# Patient Record
Sex: FEMALE | Race: WHITE | NOT HISPANIC OR LATINO | Employment: OTHER | URBAN - METROPOLITAN AREA
[De-identification: names, ages, dates, MRNs, and addresses within clinical notes are randomized per-mention and may not be internally consistent; named-entity substitution may affect disease eponyms.]

---

## 2020-01-16 ENCOUNTER — HOSPITAL ENCOUNTER (OUTPATIENT)
Facility: CLINIC | Age: 85
End: 2020-01-16
Attending: INTERNAL MEDICINE | Admitting: INTERNAL MEDICINE
Payer: COMMERCIAL

## 2020-08-29 ENCOUNTER — HOSPITAL ENCOUNTER (EMERGENCY)
Facility: CLINIC | Age: 85
Discharge: HOME | End: 2020-08-29
Attending: EMERGENCY MEDICINE | Admitting: EMERGENCY MEDICINE
Payer: COMMERCIAL

## 2020-08-29 VITALS
OXYGEN SATURATION: 97 % | TEMPERATURE: 97.2 F | HEART RATE: 67 BPM | HEIGHT: 65 IN | RESPIRATION RATE: 16 BRPM | BODY MASS INDEX: 20.99 KG/M2 | DIASTOLIC BLOOD PRESSURE: 74 MMHG | WEIGHT: 126 LBS | SYSTOLIC BLOOD PRESSURE: 167 MMHG

## 2020-08-29 DIAGNOSIS — I10 HYPERTENSION, UNSPECIFIED TYPE: ICD-10-CM

## 2020-08-29 DIAGNOSIS — N39.0 URINARY TRACT INFECTION IN FEMALE: ICD-10-CM

## 2020-08-29 LAB
ALBUMIN UR-MCNC: NEGATIVE MG/DL
APPEARANCE UR: CLEAR
BILIRUB UR QL STRIP: NEGATIVE
COLOR UR AUTO: ABNORMAL
GLUCOSE UR STRIP-MCNC: NEGATIVE MG/DL
HGB UR QL STRIP: NEGATIVE
KETONES UR STRIP-MCNC: NEGATIVE MG/DL
LEUKOCYTE ESTERASE UR QL STRIP: ABNORMAL
MUCOUS THREADS #/AREA URNS LPF: PRESENT /LPF
NITRATE UR QL: NEGATIVE
PH UR STRIP: 6.5 PH (ref 5–7)
RBC #/AREA URNS AUTO: <1 /HPF (ref 0–2)
SOURCE: ABNORMAL
SP GR UR STRIP: 1 (ref 1–1.03)
UROBILINOGEN UR STRIP-MCNC: NORMAL MG/DL (ref 0–2)
WBC #/AREA URNS AUTO: 39 /HPF (ref 0–5)

## 2020-08-29 PROCEDURE — 81001 URINALYSIS AUTO W/SCOPE: CPT | Performed by: EMERGENCY MEDICINE

## 2020-08-29 PROCEDURE — 25000132 ZZH RX MED GY IP 250 OP 250 PS 637: Performed by: EMERGENCY MEDICINE

## 2020-08-29 PROCEDURE — 99283 EMERGENCY DEPT VISIT LOW MDM: CPT

## 2020-08-29 RX ORDER — CEPHALEXIN 500 MG/1
500 CAPSULE ORAL ONCE
Status: COMPLETED | OUTPATIENT
Start: 2020-08-29 | End: 2020-08-29

## 2020-08-29 RX ORDER — LEVOTHYROXINE SODIUM 25 UG/1
25 TABLET ORAL
COMMUNITY
Start: 2019-11-11

## 2020-08-29 RX ORDER — ZOLPIDEM TARTRATE 5 MG/1
5 TABLET ORAL
COMMUNITY
Start: 2020-08-19

## 2020-08-29 RX ORDER — CEPHALEXIN 500 MG/1
500 CAPSULE ORAL 2 TIMES DAILY
Qty: 14 CAPSULE | Refills: 0 | Status: SHIPPED | OUTPATIENT
Start: 2020-08-29 | End: 2020-09-05

## 2020-08-29 RX ADMIN — CEPHALEXIN 500 MG: 500 CAPSULE ORAL at 21:38

## 2020-08-29 ASSESSMENT — ENCOUNTER SYMPTOMS
SHORTNESS OF BREATH: 0
SORE THROAT: 0
DYSURIA: 1
COUGH: 0
FREQUENCY: 1
BACK PAIN: 0
ABDOMINAL PAIN: 0
FEVER: 0

## 2020-08-29 ASSESSMENT — MIFFLIN-ST. JEOR: SCORE: 1022.41

## 2020-08-29 NOTE — ED AVS SNAPSHOT
Emergency Department  [address] [facility] [address]  Phone:  [phone]  Fax:  [phone]                                    Marce Lin   MRN: [id number]    Department:   Emergency Department   Date of Visit:  8/29/2020           After Visit Summary Signature Page    I have received my discharge instructions, and my questions have been answered. I have discussed any challenges I see with this plan with the nurse or doctor.    ..........................................................................................................................................  Patient/Patient Representative Signature      ..........................................................................................................................................  Patient Representative Print Name and Relationship to Patient    ..................................................               ................................................  Date                                   Time    ..........................................................................................................................................  Reviewed by Signature/Title    ...................................................              ..............................................  Date                                               Time          22EPIC Rev 08/18

## 2020-08-30 NOTE — ED TRIAGE NOTES
Alert and Oriented to person, place, time and situation.    Airway patent.    Respirations are regular and unlabored.  Patient talking in full sentences.  Patient denies cough or shortness of breath.    Pulses are strong and regular with palpation.  Skin is normal color, warm and dry.   Cap refill is less than 3 seconds.  Patient denies chest pain/pressure.    Frequency and urgency started this afternoon. No fevers, no dizziness. No pain in back. Intermittant twinges of bladder discomfort.

## 2020-08-30 NOTE — ED PROVIDER NOTES
"  History   Chief Complaint:  UTI      The history is provided by the patient.      Marce Lin is a 85 year old female with history of UTI around 3 years ago who presents for evaluation of UTI. The patient notes that she had done a tele health call this morning regarding urinary symptoms. She notes that she had frequency and urgency. She denies fever, emesis, back pain, vaginal discharge or bleeding, sore throat, cough, or COVID exposures. She notes that she is being treated for hemorrhoids.     Allergies:  Augmentin  Penicillins    Medications:   Levothyroxine   Ambien    Omeprazole   Pepcid     Past Medical History:    Anemia  GERD  Thyroid disorder  Hemorrhoids  T12 fracture  Osteoporosis    Past Surgical History:    Hysterectomy   Hernia repair   Tonsillectomy   Repair ptosis bilateral     Family History:    Cancer, daughter  Arthritis, sister    Social History:  Smoking status: Never smoker  Alcohol use: yes  Drug use: no  PCP: Yudy Garcia MD  Presents to the ED alone  Up to date on immunization    Review of Systems   Constitutional: Negative for fever.   HENT: Negative for sore throat.    Respiratory: Negative for cough and shortness of breath.    Gastrointestinal: Negative for abdominal pain.   Genitourinary: Positive for dysuria, frequency and urgency. Negative for vaginal bleeding.   Musculoskeletal: Negative for back pain.   All other systems reviewed and are negative.      Physical Exam     Patient Vitals for the past 24 hrs:   BP Temp Temp src Pulse Resp SpO2 Height Weight   08/29/20 2145 (!) 167/74 -- -- 67 -- -- -- --   08/29/20 2138 (!) 178/89 -- -- 77 -- 97 % -- --   08/29/20 2037 97/75 97.2  F (36.2  C) Temporal 104 16 99 % 1.651 m (5' 5\") 57.2 kg (126 lb)       Physical Exam  Physical Exam   Constitutional:  Patient is oriented to person, place, and time. They appear well-developed and well-nourished. Mild distress secondary to UTI.   HENT:   Eyes:    Conjunctivae normal and EOM are normal. " Pupils are equal, round, and reactive to light.   Neck:    Normal range of motion.   Cardiovascular: Normal rate, regular rhythm and normal heart sounds.  Exam reveals no gallop and no friction rub.  No murmur heard.  Pulmonary/Chest:  Effort normal and breath sounds normal. Patient has no wheezes. Patient has no rales.   Abdominal:   Soft. Bowel sounds are normal. Patient exhibits no mass. No CVA tenderness. No lower abdominal tenderness to palpation. There is no rebound and no guarding.   Musculoskeletal:  Normal range of motion. Patient exhibits no edema.   Neurological:   Patient is alert and oriented to person, place, and time. Patient has normal strength. No cranial nerve deficit or sensory deficit. GCS 15  Skin:   Skin is warm and dry. No rash noted. No erythema.   Psychiatric:   Patient has a normal mood and affect. Patient's behavior is normal. Judgment and thought content normal.       Emergency Department Course   Laboratory:  Laboratory findings were communicated with the patient who voiced understanding of the findings.    UA with micro: specific gravity 1.002(L), large leukocyte esterase (A), WBC/HPF 39(H), mucous present (A)  o/w negative       Interventions:  2138 Keflex, 500 mg, PO    Emergency Department Course:   Nursing notes and vitals reviewed.    2118 I performed an exam of the patient as documented above. I personally reviewed the results with the patient and answered all related questions prior to discharge.    Findings and plan explained to the Patient. Patient discharged home with instructions regarding supportive care, medications, and reasons to return. The importance of close follow-up was reviewed. The patient was prescribed keflex.       Impression & Plan      Medical Decision Making:  Marce Lin is a 85 year old female has symptoms of frequency, dysuria and .   Urinalysis was obtained and confirms the infection.  There has been no fever, significant back/flank pain or significant  abdominal pain.  There is no clinical evidence of pyelonephritis, appendicitis,  or diverticulitis.  The patient will be started on antibiotics for the infection. The patient was hypertensive here in the ED and was recommended that she have this checked with her primary care doctor. The patient is instructed to return if increasing pain, vomiting, fever, or inability to tolerate the oral antibiotic.  Follow up with primary physician is indicated if not improving in 2-3 days.       Diagnosis:    ICD-10-CM    1. Urinary tract infection in female  N39.0    2. Hypertension, unspecified type  I10        Disposition:   The patient is discharged to home.     Discharge Medications:  Discharge Medication List as of 8/29/2020  9:56 PM      START taking these medications    Details   cephALEXin (KEFLEX) 500 MG capsule Take 1 capsule (500 mg) by mouth 2 times daily for 7 days, Disp-14 capsule,R-0, E-Prescribe             Scribe Disclosure:  Alisia GARCIA, am serving as a scribe at 9:18 PM on 8/29/2020 to document services personally performed by Marine Shrestha MD based on my observations and the provider's statements to me.  [facility] EMERGENCY DEPARTMENT         Marine Shrestha MD  08/29/20 7126

## 2021-02-10 ENCOUNTER — IMMUNIZATION (OUTPATIENT)
Dept: NURSING | Facility: CLINIC | Age: 86
End: 2021-02-10
Payer: COMMERCIAL

## 2021-02-10 PROCEDURE — 91300 PR COVID VAC PFIZER DIL RECON 30 MCG/0.3 ML IM: CPT

## 2021-02-10 PROCEDURE — 0001A PR COVID VAC PFIZER DIL RECON 30 MCG/0.3 ML IM: CPT

## 2021-03-03 ENCOUNTER — IMMUNIZATION (OUTPATIENT)
Dept: NURSING | Facility: CLINIC | Age: 86
End: 2021-03-03
Attending: INTERNAL MEDICINE
Payer: COMMERCIAL

## 2021-03-03 PROCEDURE — 0002A PR COVID VAC PFIZER DIL RECON 30 MCG/0.3 ML IM: CPT

## 2021-03-03 PROCEDURE — 91300 PR COVID VAC PFIZER DIL RECON 30 MCG/0.3 ML IM: CPT

## 2021-03-21 ENCOUNTER — HEALTH MAINTENANCE LETTER (OUTPATIENT)
Age: 86
End: 2021-03-21

## 2021-09-04 ENCOUNTER — HEALTH MAINTENANCE LETTER (OUTPATIENT)
Age: 86
End: 2021-09-04

## 2022-04-16 ENCOUNTER — HEALTH MAINTENANCE LETTER (OUTPATIENT)
Age: 87
End: 2022-04-16

## 2022-09-02 ENCOUNTER — HOSPITAL ENCOUNTER (EMERGENCY)
Facility: CLINIC | Age: 87
Discharge: HOME | End: 2022-09-02
Attending: EMERGENCY MEDICINE | Admitting: EMERGENCY MEDICINE
Payer: COMMERCIAL

## 2022-09-02 ENCOUNTER — APPOINTMENT (OUTPATIENT)
Dept: CT IMAGING | Facility: CLINIC | Age: 87
End: 2022-09-02
Attending: EMERGENCY MEDICINE
Payer: COMMERCIAL

## 2022-09-02 VITALS
SYSTOLIC BLOOD PRESSURE: 154 MMHG | RESPIRATION RATE: 16 BRPM | OXYGEN SATURATION: 99 % | DIASTOLIC BLOOD PRESSURE: 84 MMHG | HEART RATE: 87 BPM | TEMPERATURE: 98.4 F

## 2022-09-02 DIAGNOSIS — K57.32 DIVERTICULITIS OF COLON: ICD-10-CM

## 2022-09-02 LAB
ALBUMIN SERPL-MCNC: 3.7 G/DL (ref 3.4–5)
ALBUMIN UR-MCNC: NEGATIVE MG/DL
ALP SERPL-CCNC: 113 U/L (ref 40–150)
ALT SERPL W P-5'-P-CCNC: 18 U/L (ref 0–50)
ANION GAP SERPL CALCULATED.3IONS-SCNC: 8 MMOL/L (ref 3–14)
APPEARANCE UR: CLEAR
AST SERPL W P-5'-P-CCNC: 14 U/L (ref 0–45)
ATRIAL RATE - MUSE: 101 BPM
BASOPHILS # BLD AUTO: 0 10E3/UL (ref 0–0.2)
BASOPHILS NFR BLD AUTO: 0 %
BILIRUB SERPL-MCNC: 0.9 MG/DL (ref 0.2–1.3)
BILIRUB UR QL STRIP: NEGATIVE
BUN SERPL-MCNC: 13 MG/DL (ref 7–30)
CALCIUM SERPL-MCNC: 9.3 MG/DL (ref 8.5–10.1)
CHLORIDE BLD-SCNC: 101 MMOL/L (ref 94–109)
CO2 SERPL-SCNC: 24 MMOL/L (ref 20–32)
COLOR UR AUTO: ABNORMAL
CREAT SERPL-MCNC: 0.72 MG/DL (ref 0.52–1.04)
DIASTOLIC BLOOD PRESSURE - MUSE: NORMAL MMHG
EOSINOPHIL # BLD AUTO: 0.1 10E3/UL (ref 0–0.7)
EOSINOPHIL NFR BLD AUTO: 0 %
ERYTHROCYTE [DISTWIDTH] IN BLOOD BY AUTOMATED COUNT: 12.3 % (ref 10–15)
GFR SERPL CREATININE-BSD FRML MDRD: 81 ML/MIN/1.73M2
GLUCOSE BLD-MCNC: 110 MG/DL (ref 70–99)
GLUCOSE UR STRIP-MCNC: NEGATIVE MG/DL
HCT VFR BLD AUTO: 37.8 % (ref 35–47)
HGB BLD-MCNC: 12.4 G/DL (ref 11.7–15.7)
HGB UR QL STRIP: NEGATIVE
IMM GRANULOCYTES # BLD: 0 10E3/UL
IMM GRANULOCYTES NFR BLD: 0 %
INTERPRETATION ECG - MUSE: NORMAL
KETONES UR STRIP-MCNC: NEGATIVE MG/DL
LACTATE SERPL-SCNC: 0.6 MMOL/L (ref 0.7–2)
LEUKOCYTE ESTERASE UR QL STRIP: ABNORMAL
LIPASE SERPL-CCNC: 78 U/L (ref 73–393)
LYMPHOCYTES # BLD AUTO: 1.7 10E3/UL (ref 0.8–5.3)
LYMPHOCYTES NFR BLD AUTO: 15 %
MCH RBC QN AUTO: 29.3 PG (ref 26.5–33)
MCHC RBC AUTO-ENTMCNC: 32.8 G/DL (ref 31.5–36.5)
MCV RBC AUTO: 89 FL (ref 78–100)
MONOCYTES # BLD AUTO: 1.1 10E3/UL (ref 0–1.3)
MONOCYTES NFR BLD AUTO: 10 %
MUCOUS THREADS #/AREA URNS LPF: PRESENT /LPF
NEUTROPHILS # BLD AUTO: 8.3 10E3/UL (ref 1.6–8.3)
NEUTROPHILS NFR BLD AUTO: 75 %
NITRATE UR QL: NEGATIVE
NRBC # BLD AUTO: 0 10E3/UL
NRBC BLD AUTO-RTO: 0 /100
P AXIS - MUSE: 57 DEGREES
PH UR STRIP: 6 [PH] (ref 5–7)
PLATELET # BLD AUTO: 301 10E3/UL (ref 150–450)
POTASSIUM BLD-SCNC: 3.9 MMOL/L (ref 3.4–5.3)
PR INTERVAL - MUSE: 132 MS
PROT SERPL-MCNC: 7.3 G/DL (ref 6.8–8.8)
QRS DURATION - MUSE: 72 MS
QT - MUSE: 344 MS
QTC - MUSE: 446 MS
R AXIS - MUSE: 63 DEGREES
RBC # BLD AUTO: 4.23 10E6/UL (ref 3.8–5.2)
RBC URINE: 2 /HPF
SODIUM SERPL-SCNC: 133 MMOL/L (ref 133–144)
SP GR UR STRIP: 1.01 (ref 1–1.03)
SQUAMOUS EPITHELIAL: <1 /HPF
SYSTOLIC BLOOD PRESSURE - MUSE: NORMAL MMHG
T AXIS - MUSE: 53 DEGREES
UROBILINOGEN UR STRIP-MCNC: NORMAL MG/DL
VENTRICULAR RATE- MUSE: 101 BPM
WBC # BLD AUTO: 11.2 10E3/UL (ref 4–11)
WBC URINE: 55 /HPF

## 2022-09-02 PROCEDURE — 250N000013 HC RX MED GY IP 250 OP 250 PS 637: Performed by: EMERGENCY MEDICINE

## 2022-09-02 PROCEDURE — 96360 HYDRATION IV INFUSION INIT: CPT | Mod: 59

## 2022-09-02 PROCEDURE — 250N000009 HC RX 250: Performed by: EMERGENCY MEDICINE

## 2022-09-02 PROCEDURE — 250N000011 HC RX IP 250 OP 636: Performed by: EMERGENCY MEDICINE

## 2022-09-02 PROCEDURE — 81001 URINALYSIS AUTO W/SCOPE: CPT | Performed by: EMERGENCY MEDICINE

## 2022-09-02 PROCEDURE — 36415 COLL VENOUS BLD VENIPUNCTURE: CPT | Performed by: EMERGENCY MEDICINE

## 2022-09-02 PROCEDURE — 83690 ASSAY OF LIPASE: CPT | Performed by: EMERGENCY MEDICINE

## 2022-09-02 PROCEDURE — 99285 EMERGENCY DEPT VISIT HI MDM: CPT | Mod: 25

## 2022-09-02 PROCEDURE — 93005 ELECTROCARDIOGRAM TRACING: CPT

## 2022-09-02 PROCEDURE — 74177 CT ABD & PELVIS W/CONTRAST: CPT

## 2022-09-02 PROCEDURE — 85025 COMPLETE CBC W/AUTO DIFF WBC: CPT | Performed by: EMERGENCY MEDICINE

## 2022-09-02 PROCEDURE — 258N000003 HC RX IP 258 OP 636: Performed by: EMERGENCY MEDICINE

## 2022-09-02 PROCEDURE — 83605 ASSAY OF LACTIC ACID: CPT | Performed by: EMERGENCY MEDICINE

## 2022-09-02 PROCEDURE — 80053 COMPREHEN METABOLIC PANEL: CPT | Performed by: EMERGENCY MEDICINE

## 2022-09-02 RX ORDER — FENTANYL CITRATE 50 UG/ML
50 INJECTION, SOLUTION INTRAMUSCULAR; INTRAVENOUS ONCE
Status: DISCONTINUED | OUTPATIENT
Start: 2022-09-02 | End: 2022-09-03 | Stop reason: HOSPADM

## 2022-09-02 RX ORDER — MORPHINE SULFATE 4 MG/ML
4 INJECTION, SOLUTION INTRAMUSCULAR; INTRAVENOUS
Status: DISCONTINUED | OUTPATIENT
Start: 2022-09-02 | End: 2022-09-03 | Stop reason: HOSPADM

## 2022-09-02 RX ORDER — ONDANSETRON 4 MG/1
4 TABLET, ORALLY DISINTEGRATING ORAL EVERY 6 HOURS PRN
Qty: 10 TABLET | Refills: 0 | Status: SHIPPED | OUTPATIENT
Start: 2022-09-02 | End: 2022-09-05

## 2022-09-02 RX ORDER — IOPAMIDOL 755 MG/ML
63 INJECTION, SOLUTION INTRAVASCULAR ONCE
Status: COMPLETED | OUTPATIENT
Start: 2022-09-02 | End: 2022-09-02

## 2022-09-02 RX ORDER — CIPROFLOXACIN 500 MG/1
500 TABLET, FILM COATED ORAL 2 TIMES DAILY
Qty: 20 TABLET | Refills: 0 | Status: SHIPPED | OUTPATIENT
Start: 2022-09-02 | End: 2022-09-12

## 2022-09-02 RX ORDER — HYDROCODONE BITARTRATE AND ACETAMINOPHEN 5; 325 MG/1; MG/1
1 TABLET ORAL EVERY 6 HOURS PRN
Qty: 10 TABLET | Refills: 0 | Status: SHIPPED | OUTPATIENT
Start: 2022-09-02 | End: 2022-09-05

## 2022-09-02 RX ORDER — CIPROFLOXACIN 500 MG/1
500 TABLET, FILM COATED ORAL ONCE
Status: COMPLETED | OUTPATIENT
Start: 2022-09-02 | End: 2022-09-02

## 2022-09-02 RX ORDER — METRONIDAZOLE 500 MG/1
500 TABLET ORAL 3 TIMES DAILY
Qty: 30 TABLET | Refills: 0 | Status: SHIPPED | OUTPATIENT
Start: 2022-09-02 | End: 2022-09-12

## 2022-09-02 RX ORDER — METRONIDAZOLE 500 MG/1
500 TABLET ORAL ONCE
Status: COMPLETED | OUTPATIENT
Start: 2022-09-02 | End: 2022-09-02

## 2022-09-02 RX ADMIN — IOPAMIDOL 63 ML: 755 INJECTION, SOLUTION INTRAVENOUS at 22:39

## 2022-09-02 RX ADMIN — SODIUM CHLORIDE 1000 ML: 9 INJECTION, SOLUTION INTRAVENOUS at 22:21

## 2022-09-02 RX ADMIN — METRONIDAZOLE 500 MG: 500 TABLET ORAL at 23:31

## 2022-09-02 RX ADMIN — SODIUM CHLORIDE 60 ML: 900 INJECTION INTRAVENOUS at 22:39

## 2022-09-02 RX ADMIN — CIPROFLOXACIN HYDROCHLORIDE 500 MG: 500 TABLET, FILM COATED ORAL at 23:31

## 2022-09-02 ASSESSMENT — ACTIVITIES OF DAILY LIVING (ADL): ADLS_ACUITY_SCORE: 35

## 2022-09-02 ASSESSMENT — ENCOUNTER SYMPTOMS
FEVER: 0
CONSTIPATION: 0
COUGH: 0
DIARRHEA: 0
SHORTNESS OF BREATH: 0
ABDOMINAL PAIN: 1
VOMITING: 0
NAUSEA: 0

## 2022-09-02 NOTE — ED TRIAGE NOTES
Pt comes from  where pt had HTN and tachy with abd pain. Pt was told to come in for CT. Had 3 BMs in 2 days     Triage Assessment     Row Name 09/02/22 1353       Triage Assessment (Adult)    Airway WDL WDL       Respiratory WDL    Respiratory WDL WDL       Skin Circulation/Temperature WDL    Skin Circulation/Temperature WDL WDL       Cardiac WDL    Cardiac WDL X;all       Chest Pain Assessment    Associated Signs/Symptoms hypertension;tachycardia       Peripheral/Neurovascular WDL    Peripheral Neurovascular WDL WDL       Cognitive/Neuro/Behavioral WDL    Cognitive/Neuro/Behavioral WDL WDL

## 2022-09-02 NOTE — ED NOTES
Rapid Assessment Note    History:   Marce Lin is a 87 year old female who presents with abdominal pain. Points to lower midline abdomen.  She was at urgent care earlier today and was sent here for a CT scan.  Pain started yesterday, no urinary symptoms associated with it. She has had a hernia repair and hysterectomy. She doesn't take blood pressure medications or any Tylenol. No vomiting, nausea, diarrhea, fever, cough, shortness of breath, chest pain.  Denies chronic HTN but states BP goes up when she is sick.  No headache, speech changes, extremity weakness.    BP (!) 205/99   Pulse 115   Temp 98.4  F (36.9  C) (Temporal)   Resp 16   SpO2 96%      Exam:   Resp:  Non-labored  Neuro:  Alert and cooperative  MSkel:  Moving all extremities  Abd:  Soft, no focal tenderness    Plan of Care:   I evaluated the patient and developed an initial plan of care including labs, urine, CT.  Pain medication ordered - monitor BP after improved symptom control.  I discussed this plan and explained that I, or one of my partners, would be returning to complete the evaluation.     I, Kiran Newton, am serving as a scribe to document services personally performed by Noelle Cruz MD, based on my observations and the provider's statements to me.    09/02/2022  [employer]    Portions of this medical record were completed by a scribe. UPON MY REVIEW AND AUTHENTICATION BY ELECTRONIC SIGNATURE, this confirms (a) I performed the applicable clinical services, and (b) the record is accurate.      Noelle Cruz MD  09/02/22 3785

## 2022-09-03 NOTE — ED PROVIDER NOTES
History   Chief Complaint:  Abdominal Pain    The history is provided by the patient.      Marce Lin is a 87 year old female with history of a hernia repair and hysterectomy who presents with lower midline abdominal pain that started yesterday. The patient states that her pain is constant and is slightly better today. Laying down and applying a heat pad did not help to ease her pain. The patient states she had a large bm yesterday and two bms today that were solid. She denies that she is experiencing nausea, vomiting, diarrhea, constipation, fever, shortness of breath, chest pain, or cough.     Review of Systems   Constitutional: Negative for fever.   Respiratory: Negative for cough and shortness of breath.    Cardiovascular: Negative for chest pain.   Gastrointestinal: Positive for abdominal pain. Negative for constipation, diarrhea, nausea and vomiting.   All other systems reviewed and are negative.    Allergies:  Amoxicillin-Pot Clavulanate  Augmentin  Pcn  Most glaucoma drops    Medications:  Acetaminophen  Levothyroxine  Multiple Vitamins-Iron  Omeprazole  Zolpidem    Past Medical History:     IBS  Insomnia  GERD  Osteoporosis  Hypothyroidism  Glaucoma  Anemia  Anxiety  Cataract  Hyperlipidemia    Past Surgical History:    Colonoscopy  ENT surgery  Eye surgery  Hernia repair  Ptosis bilateral repair  Tonsillectomy  Hysterectomy    Family History:    Father: cancer  Mother: CAD  Siblings: arthritis, myeloma, leukemia  Daughter: cancer    Social History:  The patient presents to the ED with her daughter.  PCP: No Ref-Primary, Physician     Physical Exam     Patient Vitals for the past 24 hrs:   BP Temp Temp src Pulse Resp SpO2   09/02/22 2025 (!) 154/84 -- -- 87 -- 99 %   09/02/22 1853 (!) 205/99 98.4  F (36.9  C) Temporal 115 16 96 %       Physical Exam  General/Appearance: appears stated age, well-groomed, appears comfortable  Eyes: EOMI, no scleral injection, no icterus  ENT: MMM  Neck: supple, nl ROM, no  stiffness  Cardiovascular: RRR, nl S1S2, no m/r/g, 2+ pulses in all 4 extremities, cap refill <2sec  Respiratory: CTAB, good air movement throughout, no wheezes/rhonchi/rales, no increased WOB, no retractions  GI: abd soft but mildly bloated, no ttp, no HSM, no rebound, no guarding, nl BS  MSK: WANG, good tone, no bony abnormality  Skin: warm and well-perfused, no rash, no edema, no ecchymosis, nl turgor  Neuro: GCS 15, alert and oriented, no gross focal neuro deficits  Psych: interacts appropriately  Heme: no petechia, no purpura, no active bleeding    Emergency Department Course   ECG  ECG taken at 2208, ECG read at 2215  Sinus tachycardia   Rate 101 bpm. KY interval 132 ms. QRS duration 72 ms. QT/QTc 344/446 ms. P-R-T axes 57 63 53.     Imaging:  CT Abdomen Pelvis w Contrast   Final Result   IMPRESSION: Diverticulitis of the mid sigmoid colon. No abscess.         Report per radiology    Laboratory:  Labs Ordered and Resulted from Time of ED Arrival to Time of ED Departure   COMPREHENSIVE METABOLIC PANEL - Abnormal       Result Value    Sodium 133      Potassium 3.9      Chloride 101      Carbon Dioxide (CO2) 24      Anion Gap 8      Urea Nitrogen 13      Creatinine 0.72      Calcium 9.3      Glucose 110 (*)     Alkaline Phosphatase 113      AST 14      ALT 18      Protein Total 7.3      Albumin 3.7      Bilirubin Total 0.9      GFR Estimate 81     LACTIC ACID WHOLE BLOOD - Abnormal    Lactic Acid 0.6 (*)    ROUTINE UA WITH MICROSCOPIC - Abnormal    Color Urine Light Yellow      Appearance Urine Clear      Glucose Urine Negative      Bilirubin Urine Negative      Ketones Urine Negative      Specific Gravity Urine 1.008      Blood Urine Negative      pH Urine 6.0      Protein Albumin Urine Negative      Urobilinogen Urine Normal      Nitrite Urine Negative      Leukocyte Esterase Urine Large (*)     Mucus Urine Present (*)     RBC Urine 2      WBC Urine 55 (*)     Squamous Epithelials Urine <1     CBC WITH PLATELETS  AND DIFFERENTIAL - Abnormal    WBC Count 11.2 (*)     RBC Count 4.23      Hemoglobin 12.4      Hematocrit 37.8      MCV 89      MCH 29.3      MCHC 32.8      RDW 12.3      Platelet Count 301      % Neutrophils 75      % Lymphocytes 15      % Monocytes 10      % Eosinophils 0      % Basophils 0      % Immature Granulocytes 0      NRBCs per 100 WBC 0      Absolute Neutrophils 8.3      Absolute Lymphocytes 1.7      Absolute Monocytes 1.1      Absolute Eosinophils 0.1      Absolute Basophils 0.0      Absolute Immature Granulocytes 0.0      Absolute NRBCs 0.0     LIPASE - Normal    Lipase 78        Emergency Department Course:     Reviewed:  I reviewed nursing notes, vitals, past medical history and Care Everywhere    Assessments:  2205 I obtained history and examined the patient as noted above.   2322 I rechecked the patient and explained findings.     Interventions:  2221 NS 1 L IV  2331 Flagyl 500 mg PO  2331 Cipro 500 mg PO    Disposition:  The patient was discharged to home.     Impression & Plan     Medical Decision Making:  This pt presented with abdominal pain and CT confirmation of diverticulitis without abscess or perforation; this appears consistent with the history and physical exam so I doubt another underlying etiology is also present.  The patient's pain has been controlled by interventions in the emergency department.  This represents uncomplicated diverticulitis at this time as there are no signs of sepsis, shock or bacteremia.  The patient is tolerating po and pain is controlled and I believe safe for outpatient treatment.  They have been started on Flagyl and Cipro -- they will be d/c'd with Rx's for these, pain meds, and nausea meds.  I educated the patient regarding the symptoms and signs that should prompt return to the Emergency Department.  This would include worsening fevers, chills, vomiting, and more intense pain.  The patient is to take antibiotics and pain medications as directed.    Follow-up  with primary care physician is indicated in 1-2 days.     Diagnosis:    ICD-10-CM    1. Diverticulitis of colon  K57.32        Discharge Medications:  New Prescriptions    CIPROFLOXACIN (CIPRO) 500 MG TABLET    Take 1 tablet (500 mg) by mouth 2 times daily for 10 days    HYDROCODONE-ACETAMINOPHEN (NORCO) 5-325 MG TABLET    Take 1 tablet by mouth every 6 hours as needed for severe pain    METRONIDAZOLE (FLAGYL) 500 MG TABLET    Take 1 tablet (500 mg) by mouth 3 times daily for 10 days    ONDANSETRON (ZOFRAN ODT) 4 MG ODT TAB    Take 1 tablet (4 mg) by mouth every 6 hours as needed for nausea or vomiting       Scribe Disclosure:  I, Jos Vasquez, am serving as a scribe at 10:02 PM on 9/2/2022 to document services personally performed by Enedina Bernardo MD based on my observations and the provider's statements to me.        Enedina Bernardo MD  09/03/22 0036

## 2022-09-08 ENCOUNTER — TELEPHONE (OUTPATIENT)
Dept: FAMILY MEDICINE | Facility: CLINIC | Age: 87
End: 2022-09-08

## 2022-09-09 ENCOUNTER — NURSE TRIAGE (OUTPATIENT)
Dept: NURSING | Facility: CLINIC | Age: 87
End: 2022-09-09

## 2022-09-09 NOTE — TELEPHONE ENCOUNTER
Patient is calling to report loose stools that started 2 days ago.   On 9/3, she was diagnosed with diverticulitis and she was given cipro and flagyl to take at home.  Cipro 2x a day  Flagyl 3x a day  She has 4 more days of this.  She has loose stools about 2 times a day but she states that not much comes out.  Denies having blood in her stool.  She denies abdominal pain right now.  Her abd pain usually starts in the afternoon after she takes her Flagyl.  She is hydrating well.    She also reports that she had a headache and some chills yesterday but denies having fevers.  She took a tylenol for her headache yesterday and it went away.    She has an appt with EDNA HIDALGO on Wednesday.    Disposition:  See PCP within 24 hours.  Care advice given.  Also pulled up information from [facility] about diet for diverticulitis.  Pt appreciated this.  Pt verbalized understanding.    Jaimie Marsh, RN, BSN Nurse Triage Advisor 9/9/2022 6:22 AM     Reason for Disposition    Abdominal pain  (Exception: mild cramping that clears with each passage of diarrhea stool)    Additional Information    Negative: Shock suspected (e.g., cold/pale/clammy skin, too weak to stand, low BP, rapid pulse)    Negative: Difficult to awaken or acting confused (e.g., disoriented, slurred speech)    Negative: Sounds like a life-threatening emergency to the triager    Negative: SEVERE abdominal pain (e.g., excruciating)    Negative: [1] Blood in the stool AND [2] moderate or large amount of blood (e.g., any blood clots, passing blood without stool, toilet water turns red)    Negative: Black or tarry bowel movements  (Exception: chronic-unchanged black-grey bowel movements AND is taking iron pills or Pepto-Bismol)    Negative: [1] Drinking very little AND [2] dehydration suspected (e.g., no urine > 12 hours, very dry mouth, very lightheaded)    Negative: Patient sounds very sick or weak to the triager    Negative: SEVERE diarrhea (e.g., 7 or more times / day more than  normal)    Negative: [1] Constant abdominal pain AND [2] present > 2 hours    Negative: MODERATE diarrhea (e.g., 4-6 times / day more than normal)    Negative: Abdomen looks much more swollen than usual    Negative: [1] Taking antibiotic > 48 hours (2 days) AND [2] fever still present or recurs    Negative: [1] Taking antibiotic AND [2] new-onset of fever    Negative: Tube feedings (e.g., nasogastric, g-tube, j-tube)    Protocols used: DIARR[facility] ON ANTIBIOTICS-A-AH

## 2022-10-22 ENCOUNTER — HEALTH MAINTENANCE LETTER (OUTPATIENT)
Age: 87
End: 2022-10-22

## 2023-11-05 ENCOUNTER — HEALTH MAINTENANCE LETTER (OUTPATIENT)
Age: 88
End: 2023-11-05

## 2024-08-21 ENCOUNTER — APPOINTMENT (OUTPATIENT)
Dept: CT IMAGING | Facility: CLINIC | Age: 89
End: 2024-08-21
Attending: SOCIAL WORKER
Payer: COMMERCIAL

## 2024-08-21 ENCOUNTER — HOSPITAL ENCOUNTER (EMERGENCY)
Facility: CLINIC | Age: 89
Discharge: HOME | End: 2024-08-21
Attending: SOCIAL WORKER | Admitting: SOCIAL WORKER
Payer: COMMERCIAL

## 2024-08-21 VITALS
SYSTOLIC BLOOD PRESSURE: 175 MMHG | OXYGEN SATURATION: 97 % | RESPIRATION RATE: 20 BRPM | HEART RATE: 85 BPM | DIASTOLIC BLOOD PRESSURE: 96 MMHG | TEMPERATURE: 97.7 F

## 2024-08-21 DIAGNOSIS — Z86.69 HISTORY OF GLAUCOMA: ICD-10-CM

## 2024-08-21 DIAGNOSIS — I10 HYPERTENSION, UNSPECIFIED TYPE: ICD-10-CM

## 2024-08-21 LAB
ALBUMIN SERPL BCG-MCNC: 4.6 G/DL (ref 3.5–5.2)
ALP SERPL-CCNC: 129 U/L (ref 40–150)
ALT SERPL W P-5'-P-CCNC: 14 U/L (ref 0–50)
ANION GAP SERPL CALCULATED.3IONS-SCNC: 12 MMOL/L (ref 7–15)
AST SERPL W P-5'-P-CCNC: 21 U/L (ref 0–45)
ATRIAL RATE - MUSE: 73 BPM
BASOPHILS # BLD AUTO: 0 10E3/UL (ref 0–0.2)
BASOPHILS NFR BLD AUTO: 1 %
BILIRUB SERPL-MCNC: 0.5 MG/DL
BUN SERPL-MCNC: 16.4 MG/DL (ref 8–23)
CALCIUM SERPL-MCNC: 9.7 MG/DL (ref 8.8–10.4)
CHLORIDE SERPL-SCNC: 96 MMOL/L (ref 98–107)
CREAT SERPL-MCNC: 0.78 MG/DL (ref 0.51–0.95)
DIASTOLIC BLOOD PRESSURE - MUSE: NORMAL MMHG
EGFRCR SERPLBLD CKD-EPI 2021: 73 ML/MIN/1.73M2
EOSINOPHIL # BLD AUTO: 0.2 10E3/UL (ref 0–0.7)
EOSINOPHIL NFR BLD AUTO: 2 %
ERYTHROCYTE [DISTWIDTH] IN BLOOD BY AUTOMATED COUNT: 12 % (ref 10–15)
GLUCOSE SERPL-MCNC: 103 MG/DL (ref 70–99)
HCO3 SERPL-SCNC: 26 MMOL/L (ref 22–29)
HCT VFR BLD AUTO: 38 % (ref 35–47)
HGB BLD-MCNC: 13.1 G/DL (ref 11.7–15.7)
HOLD SPECIMEN: NORMAL
HOLD SPECIMEN: NORMAL
IMM GRANULOCYTES # BLD: 0 10E3/UL
IMM GRANULOCYTES NFR BLD: 0 %
INTERPRETATION ECG - MUSE: NORMAL
LYMPHOCYTES # BLD AUTO: 2 10E3/UL (ref 0.8–5.3)
LYMPHOCYTES NFR BLD AUTO: 28 %
MCH RBC QN AUTO: 30.8 PG (ref 26.5–33)
MCHC RBC AUTO-ENTMCNC: 34.5 G/DL (ref 31.5–36.5)
MCV RBC AUTO: 89 FL (ref 78–100)
MONOCYTES # BLD AUTO: 0.6 10E3/UL (ref 0–1.3)
MONOCYTES NFR BLD AUTO: 8 %
NEUTROPHILS # BLD AUTO: 4.3 10E3/UL (ref 1.6–8.3)
NEUTROPHILS NFR BLD AUTO: 60 %
NRBC # BLD AUTO: 0 10E3/UL
NRBC BLD AUTO-RTO: 0 /100
P AXIS - MUSE: 74 DEGREES
PLATELET # BLD AUTO: 278 10E3/UL (ref 150–450)
POTASSIUM SERPL-SCNC: 3.8 MMOL/L (ref 3.4–5.3)
PR INTERVAL - MUSE: 146 MS
PROT SERPL-MCNC: 7.7 G/DL (ref 6.4–8.3)
QRS DURATION - MUSE: 68 MS
QT - MUSE: 388 MS
QTC - MUSE: 427 MS
R AXIS - MUSE: 74 DEGREES
RBC # BLD AUTO: 4.26 10E6/UL (ref 3.8–5.2)
SODIUM SERPL-SCNC: 134 MMOL/L (ref 135–145)
SYSTOLIC BLOOD PRESSURE - MUSE: NORMAL MMHG
T AXIS - MUSE: 74 DEGREES
TROPONIN T SERPL HS-MCNC: 11 NG/L
VENTRICULAR RATE- MUSE: 73 BPM
WBC # BLD AUTO: 7.1 10E3/UL (ref 4–11)

## 2024-08-21 PROCEDURE — 85048 AUTOMATED LEUKOCYTE COUNT: CPT | Performed by: EMERGENCY MEDICINE

## 2024-08-21 PROCEDURE — 99285 EMERGENCY DEPT VISIT HI MDM: CPT | Mod: 25

## 2024-08-21 PROCEDURE — 250N000013 HC RX MED GY IP 250 OP 250 PS 637: Performed by: SOCIAL WORKER

## 2024-08-21 PROCEDURE — 85025 COMPLETE CBC W/AUTO DIFF WBC: CPT | Performed by: SOCIAL WORKER

## 2024-08-21 PROCEDURE — 70450 CT HEAD/BRAIN W/O DYE: CPT

## 2024-08-21 PROCEDURE — 93005 ELECTROCARDIOGRAM TRACING: CPT

## 2024-08-21 PROCEDURE — 80053 COMPREHEN METABOLIC PANEL: CPT | Performed by: EMERGENCY MEDICINE

## 2024-08-21 PROCEDURE — 84484 ASSAY OF TROPONIN QUANT: CPT | Performed by: SOCIAL WORKER

## 2024-08-21 PROCEDURE — 36415 COLL VENOUS BLD VENIPUNCTURE: CPT | Performed by: EMERGENCY MEDICINE

## 2024-08-21 RX ORDER — ACETAMINOPHEN 500 MG
500 TABLET ORAL ONCE
Status: COMPLETED | OUTPATIENT
Start: 2024-08-21 | End: 2024-08-21

## 2024-08-21 RX ADMIN — ACETAMINOPHEN 500 MG: 500 TABLET, FILM COATED ORAL at 17:46

## 2024-08-21 ASSESSMENT — ACTIVITIES OF DAILY LIVING (ADL)
ADLS_ACUITY_SCORE: 35

## 2024-08-21 ASSESSMENT — COLUMBIA-SUICIDE SEVERITY RATING SCALE - C-SSRS
1. IN THE PAST MONTH, HAVE YOU WISHED YOU WERE DEAD OR WISHED YOU COULD GO TO SLEEP AND NOT WAKE UP?: NO
6. HAVE YOU EVER DONE ANYTHING, STARTED TO DO ANYTHING, OR PREPARED TO DO ANYTHING TO END YOUR LIFE?: NO
2. HAVE YOU ACTUALLY HAD ANY THOUGHTS OF KILLING YOURSELF IN THE PAST MONTH?: NO

## 2024-08-21 NOTE — ED PROVIDER NOTES
Emergency Department Note      History of Present Illness     Chief Complaint   Hypertension      HPI   Marce Lin is a 89 year old female with a history of elevated blood pressure without diagnosis of hypertension who presents to the ED with hypertension. She explains that two days ago while at a GI appointment she had her blood pressure recorded at 182/82, she then presented to her primary for follow-up that same day and returned 160s systolic. She used her home blood pressure cuff today and recorded 192 systolic, prompting ED visit. She notes that for the past week she has suffered ongoing lightheadedness worsened by movement as well as left eye pain. This left eye pain seemed to resolve after using her prednisolone drops. She denies vision change, headache, chest pain, shortness of breath, weakness/numbness, numbness or tingling in the face, or fever. She had a hole-punch procedure for a squamous cell mole last week, no infection symptoms.     Independent Historian   None    Review of External Notes   Reviewed nurse visit from yesterday. She was 182/82 at her GI appointment and then was 160s systolic at her clinic. Denied a headache at that time. Instructed to follow-up in September.     Past Medical History     Medical History and Problem List   Anemia   PONV  Reflux    Medications   Tylenol  Synthroid  Prilosec  Ambien    Surgical History   Past Surgical History:   Procedure Laterality Date    COLONOSCOPY      ENT SURGERY      EYE SURGERY      GYN SURGERY      HERNIA REPAIR      REPAIR PTOSIS BILATERAL Bilateral 4/24/2015    Procedure: REPAIR PTOSIS BILATERAL;  Surgeon: Ben Conner MD;  Location: [facility]     Physical Exam     Patient Vitals for the past 24 hrs:   BP Temp Temp src Pulse Resp SpO2   08/21/24 1746 (!) 175/96 -- -- -- -- --   08/21/24 1631 (!) 208/102 97.7  F (36.5  C) Temporal 85 20 97 %     Physical Exam  General: Overall stable and nontoxic appearing  HEENT: Conjunctivae clear, no  scleral icterus, mucous membranes moist  Neuro: Alert, oriented, no confusion   Speech fluent  PERRL, extraocular movements intact, no jhemi/quadrant visual field deficit   Smile symmetric and tongue midline  Sensation intact and equal over the face bilaterally, equal brow raise    strength equal and full bilaterally, flexion extension upper extremity equal bilaterally, sensation to light touch full and equal bilaterally  Dorsiflexion plantarflexion full and equal, flexion extension lower extremity equal bilaterally, sensation to light touch full and equal bilaterally  No pronator drift  No truncal ataxia  Gait steady without ataxia   CV: Regular rate and rhythm, radial and DP pulses equal  Respiratory: No signs of respiratory distress, lungs clear to auscultation bilaterally   Abdomen: Soft, nontender nondistended without rigidity or rebound  MSK: No lower extremity swelling or tenderness     Diagnostics     Lab Results   Labs Ordered and Resulted from Time of ED Arrival to Time of ED Departure   COMPREHENSIVE METABOLIC PANEL - Abnormal       Result Value    Sodium 134 (*)     Potassium 3.8      Carbon Dioxide (CO2) 26      Anion Gap 12      Urea Nitrogen 16.4      Creatinine 0.78      GFR Estimate 73      Calcium 9.7      Chloride 96 (*)     Glucose 103 (*)     Alkaline Phosphatase 129      AST 21      ALT 14      Protein Total 7.7      Albumin 4.6      Bilirubin Total 0.5     TROPONIN T, HIGH SENSITIVITY - Normal    Troponin T, High Sensitivity 11     CBC WITH PLATELETS AND DIFFERENTIAL    WBC Count 7.1      RBC Count 4.26      Hemoglobin 13.1      Hematocrit 38.0      MCV 89      MCH 30.8      MCHC 34.5      RDW 12.0      Platelet Count 278      % Neutrophils 60      % Lymphocytes 28      % Monocytes 8      % Eosinophils 2      % Basophils 1      % Immature Granulocytes 0      NRBCs per 100 WBC 0      Absolute Neutrophils 4.3      Absolute Lymphocytes 2.0      Absolute Monocytes 0.6      Absolute Eosinophils  0.2      Absolute Basophils 0.0      Absolute Immature Granulocytes 0.0      Absolute NRBCs 0.0         Imaging   Head CT w/o contrast   Final Result   IMPRESSION:   1.  No CT evidence for acute intracranial process.   2.  Brain atrophy and presumed chronic microvascular ischemic changes as above.        EKG   EKG 1747  Sinus rhythm without signs of acute ischemia  Rate 73  QRS 68 QTc 427    Independent Interpretation   CT head without signs of hemorrhage     ED Course      Medications Administered   Medications   acetaminophen (TYLENOL) tablet 500 mg (500 mg Oral $Given 8/21/24 1746)     Procedures   Procedures     Discussion of Management   None    ED Course   ED Course as of 08/21/24 1938   Wed Aug 21, 2024   1714 I obtained history and examined the patient as noted above.     1725 I rechecked the patient and took eye pressures.   1812 Pressure in R eye 6, pressure in L eye 14    1936 I rechecked the patient and explained findings.        Additional Documentation  None    Medical Decision Making / Diagnosis     CMS Diagnoses: None    MIPS       None    MDM   Marce Lin is a 89 year old female who presents to the emergency department with a chief complaint of elevated blood pressure. On exam, stable and nontoxic-appearing overall.  No sudden headache or focal neurologic deficit to suggest CVA.  No chest pain or back pain suggest aortic dissection in this overall well-appearing patient.  Troponin reassuring for duration of symptoms and without any chest pain reassuring against ACS.  No confusion or headache or lethargy to suggest PRES. laboratory workup without signs of acute kidney injury.  No shortness of breath, tachypnea, or signs to suggest pulmonary edema. R eye pressure within normal limits and L eye pressure consistent with known low pressure. Patient has an ophthalmology appointment tomorrow. Her headache improved after administration of tylenol and CT had without any acute findings. Overall no  signs of endorgan damage attributable to hypertension and as such no indication for emergent blood pressure lowering.  I had a conversation with patient about the importance of close follow-up with primary care provider to recheck blood pressure and initiate outpatient blood pressure medication management. We discussed strict return precautions including with her daughter, she verbalized understanding and was discharged in stable condition.     Disposition   The patient was discharged.     Diagnosis     ICD-10-CM    1. Hypertension, unspecified type  I10       2. History of glaucoma  Z86.69            Discharge Medications   New Prescriptions    No medications on file     Scribe Disclosure:  I, SILVESTRE WOODS, am serving as a scribe at 7:21 PM on 8/21/2024 to document services personally performed by Bina Bain MD based on my observations and the provider's statements to me.        Bina Bain MD  08/22/24 4440

## 2024-08-21 NOTE — ED TRIAGE NOTES
Patient presents to the ER for concerns of high blood pressure. Patient reports that she took her blood pressure at home today. Patient was seen at [facility] yesterday and was informed to track her BP at home. At home reading was 197/110. Patient endorses having pain in left eye for roughly a week and a headache     Triage Assessment (Adult)       Row Name 08/21/24 6648          Triage Assessment    Airway WDL WDL        Respiratory WDL    Respiratory WDL WDL        Skin Circulation/Temperature WDL    Skin Circulation/Temperature WDL WDL        Cardiac WDL    Cardiac WDL WDL        Peripheral/Neurovascular WDL    Peripheral Neurovascular WDL WDL        Cognitive/Neuro/Behavioral WDL    Cognitive/Neuro/Behavioral WDL X  Headache

## 2024-08-22 NOTE — DISCHARGE INSTRUCTIONS
You were seen in the emergency department for elevated blood pressure, we do not see signs of acute emergency at this time.  Please call your primary care doctor to schedule an appointment next 1 to 2 days to discuss starting any medications and having a blood pressure recheck.    Come back to the emergency department if you develop any chest pain, slurred speech, weakness on one side of your body, facial droop, chest pain, difficulty breathing, fainting.

## 2024-12-22 ENCOUNTER — HEALTH MAINTENANCE LETTER (OUTPATIENT)
Age: 89
End: 2024-12-22

## 2025-05-31 ENCOUNTER — HOSPITAL ENCOUNTER (EMERGENCY)
Facility: CLINIC | Age: 89
Discharge: HOME | End: 2025-05-31
Attending: EMERGENCY MEDICINE | Admitting: EMERGENCY MEDICINE
Payer: COMMERCIAL

## 2025-05-31 VITALS
HEART RATE: 78 BPM | TEMPERATURE: 97 F | OXYGEN SATURATION: 98 % | RESPIRATION RATE: 16 BRPM | SYSTOLIC BLOOD PRESSURE: 161 MMHG | DIASTOLIC BLOOD PRESSURE: 78 MMHG

## 2025-05-31 DIAGNOSIS — I10 HYPERTENSION, UNSPECIFIED TYPE: ICD-10-CM

## 2025-05-31 DIAGNOSIS — E87.1 HYPONATREMIA: ICD-10-CM

## 2025-05-31 LAB
ALBUMIN SERPL BCG-MCNC: 4.4 G/DL (ref 3.5–5.2)
ALBUMIN UR-MCNC: NEGATIVE MG/DL
ALP SERPL-CCNC: 180 U/L (ref 40–150)
ALT SERPL W P-5'-P-CCNC: 15 U/L (ref 0–50)
ANION GAP SERPL CALCULATED.3IONS-SCNC: 12 MMOL/L (ref 7–15)
APPEARANCE UR: CLEAR
AST SERPL W P-5'-P-CCNC: 22 U/L (ref 0–45)
BASOPHILS # BLD AUTO: 0.1 10E3/UL (ref 0–0.2)
BASOPHILS NFR BLD AUTO: 1 %
BILIRUB SERPL-MCNC: 0.4 MG/DL
BILIRUB UR QL STRIP: NEGATIVE
BUN SERPL-MCNC: 18.1 MG/DL (ref 8–23)
CALCIUM SERPL-MCNC: 9.6 MG/DL (ref 8.8–10.4)
CHLORIDE SERPL-SCNC: 96 MMOL/L (ref 98–107)
COLOR UR AUTO: ABNORMAL
CREAT SERPL-MCNC: 0.75 MG/DL (ref 0.51–0.95)
EGFRCR SERPLBLD CKD-EPI 2021: 76 ML/MIN/1.73M2
EOSINOPHIL # BLD AUTO: 0.3 10E3/UL (ref 0–0.7)
EOSINOPHIL NFR BLD AUTO: 4 %
ERYTHROCYTE [DISTWIDTH] IN BLOOD BY AUTOMATED COUNT: 12.1 % (ref 10–15)
GLUCOSE SERPL-MCNC: 97 MG/DL (ref 70–99)
GLUCOSE UR STRIP-MCNC: NEGATIVE MG/DL
HCO3 SERPL-SCNC: 24 MMOL/L (ref 22–29)
HCT VFR BLD AUTO: 35.8 % (ref 35–47)
HGB BLD-MCNC: 12.4 G/DL (ref 11.7–15.7)
HGB UR QL STRIP: NEGATIVE
HOLD SPECIMEN: NORMAL
HOLD SPECIMEN: NORMAL
HYALINE CASTS: 2 /LPF
IMM GRANULOCYTES # BLD: 0 10E3/UL
IMM GRANULOCYTES NFR BLD: 1 %
KETONES UR STRIP-MCNC: NEGATIVE MG/DL
LEUKOCYTE ESTERASE UR QL STRIP: ABNORMAL
LYMPHOCYTES # BLD AUTO: 1.6 10E3/UL (ref 0.8–5.3)
LYMPHOCYTES NFR BLD AUTO: 24 %
MCH RBC QN AUTO: 30.1 PG (ref 26.5–33)
MCHC RBC AUTO-ENTMCNC: 34.6 G/DL (ref 31.5–36.5)
MCV RBC AUTO: 87 FL (ref 78–100)
MONOCYTES # BLD AUTO: 0.6 10E3/UL (ref 0–1.3)
MONOCYTES NFR BLD AUTO: 9 %
NEUTROPHILS # BLD AUTO: 3.9 10E3/UL (ref 1.6–8.3)
NEUTROPHILS NFR BLD AUTO: 61 %
NITRATE UR QL: NEGATIVE
NRBC # BLD AUTO: 0 10E3/UL
NRBC BLD AUTO-RTO: 0 /100
PH UR STRIP: 6.5 [PH] (ref 5–7)
PLATELET # BLD AUTO: 335 10E3/UL (ref 150–450)
POTASSIUM SERPL-SCNC: 4.1 MMOL/L (ref 3.4–5.3)
PROT SERPL-MCNC: 7.6 G/DL (ref 6.4–8.3)
RBC # BLD AUTO: 4.12 10E6/UL (ref 3.8–5.2)
RBC URINE: <1 /HPF
SODIUM SERPL-SCNC: 132 MMOL/L (ref 135–145)
SP GR UR STRIP: 1 (ref 1–1.03)
SQUAMOUS EPITHELIAL: <1 /HPF
T4 FREE SERPL-MCNC: 1.45 NG/DL (ref 0.9–1.7)
TROPONIN T SERPL HS-MCNC: 11 NG/L
TSH SERPL DL<=0.005 MIU/L-ACNC: 6.51 UIU/ML (ref 0.3–4.2)
UROBILINOGEN UR STRIP-MCNC: NORMAL MG/DL
WBC # BLD AUTO: 6.4 10E3/UL (ref 4–11)
WBC URINE: 1 /HPF

## 2025-05-31 PROCEDURE — 84439 ASSAY OF FREE THYROXINE: CPT | Performed by: EMERGENCY MEDICINE

## 2025-05-31 PROCEDURE — 81003 URINALYSIS AUTO W/O SCOPE: CPT | Performed by: EMERGENCY MEDICINE

## 2025-05-31 PROCEDURE — 36415 COLL VENOUS BLD VENIPUNCTURE: CPT | Performed by: EMERGENCY MEDICINE

## 2025-05-31 PROCEDURE — 84484 ASSAY OF TROPONIN QUANT: CPT | Performed by: EMERGENCY MEDICINE

## 2025-05-31 PROCEDURE — 84443 ASSAY THYROID STIM HORMONE: CPT | Performed by: EMERGENCY MEDICINE

## 2025-05-31 PROCEDURE — 99284 EMERGENCY DEPT VISIT MOD MDM: CPT

## 2025-05-31 PROCEDURE — 93005 ELECTROCARDIOGRAM TRACING: CPT

## 2025-05-31 PROCEDURE — 80053 COMPREHEN METABOLIC PANEL: CPT | Performed by: EMERGENCY MEDICINE

## 2025-05-31 PROCEDURE — 85014 HEMATOCRIT: CPT | Performed by: EMERGENCY MEDICINE

## 2025-05-31 ASSESSMENT — ACTIVITIES OF DAILY LIVING (ADL)
ADLS_ACUITY_SCORE: 41
ADLS_ACUITY_SCORE: 41

## 2025-05-31 NOTE — ED PROVIDER NOTES
Emergency Department Note      History of Present Illness     Chief Complaint   Hypertension      HPI   Marce Lin is an 89 year old female with a history of hypertension, hypothyroidism, GERD, diverticulitis, anemia, and insomnia who presents to the ED with her daughter for evaluation of hypertension. The patient reports that she noticed an increase in her blood pressure upon returning from the endocrinologist yesterday, 5/30/25. She says her blood pressure was 177 systolic last night and that she was able to lower it to 148 before falling asleep. However, she woke up with elevated blood pressure again this morning. The patient notes that she typically takes 25 mg of losartan, but that her physician suggested she take a double dose for the blood pressure this morning. The patient endorses a strange sensation in her legs earlier. She denies headache, nausea, vomiting, and any changes in her urine. The patient denies chest pain, vision changes, and any numbness, tingling, or weakness in her arms or legs. She states that she has not had any new or different foods recently. The patient notes that she has been stressed out recently due to her recent doctors visits, and that she started on calcitonin two weeks ago for her osteoporosis.       Independent Historian   None    Review of External Notes   I reviewed the patient's endocrine clinic note from yesterday, 5/30/25    Past Medical History     Medical History and Problem List   Anemia  Postoperative nausea and vomiting  GERD  Diverticulitis  Hypertension  Hypothyroidism  Chronic constipation  Irritable bowel syndrome  Osteoporosis  Insomnia    Medications   Xanax  Miacalcin  Norco  Cozaar  Prilosec  Prednisolone  Synthroid/Levothroid  Timoptic-XR  Ambien    Surgical History   Colonoscopy  ENT Surgery  Eye surgery  Gyn Surgery  Hernia repair  Repair ptosis, bilateral  Hysterectomy  Tonsillectomy      Physical Exam     Patient Vitals for the past 24 hrs:   BP Temp  Temp src Pulse Resp SpO2   05/31/25 0938 (!) 161/78 -- -- 78 -- --   05/31/25 0830 (!) 148/80 -- -- 71 16 --   05/31/25 0810 -- -- -- 76 19 --   05/31/25 0800 (!) 166/85 -- -- 80 15 --   05/31/25 0714 (!) 191/112 97  F (36.1  C) Temporal 106 20 98 %     Physical Exam  Nursing note and vitals reviewed.  Constitutional:  Oriented to person, place, and time. Cooperative.   HENT:   Nose:    Nose normal.   Mouth/Throat:   Mucous membranes are normal.   Eyes:    Conjunctivae normal and EOM are normal.      Pupils are equal, round, and reactive to light.   Neck:    Trachea normal.   Cardiovascular:  Normal rate, regular rhythm, normal heart sounds and normal pulses. No murmur heard.  Pulmonary/Chest:  Effort normal and breath sounds normal.   Abdominal:   Soft. Normal appearance and bowel sounds are normal.      There is no tenderness.      There is no rebound and no CVA tenderness.   Musculoskeletal:  Extremities atraumatic x 4.   Neurological:   Alert and oriented to person, place, and time. Normal strength.      No cranial nerve deficit or sensory deficit. GCS eye subscore is 4. GCS verbal subscore is 5. GCS motor subscore is 6.   Skin:    Skin is intact. No rash noted.   Psychiatric:   Normal mood and affect.     Diagnostics     Lab Results   Labs Ordered and Resulted from Time of ED Arrival to Time of ED Departure   COMPREHENSIVE METABOLIC PANEL - Abnormal       Result Value    Sodium 132 (*)     Potassium 4.1      Carbon Dioxide (CO2) 24      Anion Gap 12      Urea Nitrogen 18.1      Creatinine 0.75      GFR Estimate 76      Calcium 9.6      Chloride 96 (*)     Glucose 97      Alkaline Phosphatase 180 (*)     AST 22      ALT 15      Protein Total 7.6      Albumin 4.4      Bilirubin Total 0.4     TSH WITH FREE T4 REFLEX - Abnormal    TSH 6.51 (*)    ROUTINE UA WITH MICROSCOPIC REFLEX TO CULTURE - Abnormal    Color Urine Straw      Appearance Urine Clear      Glucose Urine Negative      Bilirubin Urine Negative       Ketones Urine Negative      Specific Gravity Urine 1.005      Blood Urine Negative      pH Urine 6.5      Protein Albumin Urine Negative      Urobilinogen Urine Normal      Nitrite Urine Negative      Leukocyte Esterase Urine Small (*)     RBC Urine <1      WBC Urine 1      Squamous Epithelials Urine <1      Hyaline Casts Urine 2     TROPONIN T, HIGH SENSITIVITY - Normal    Troponin T, High Sensitivity 11     T4 FREE - Normal    Free T4 1.45     CBC WITH PLATELETS AND DIFFERENTIAL    WBC Count 6.4      RBC Count 4.12      Hemoglobin 12.4      Hematocrit 35.8      MCV 87      MCH 30.1      MCHC 34.6      RDW 12.1      Platelet Count 335      % Neutrophils 61      % Lymphocytes 24      % Monocytes 9      % Eosinophils 4      % Basophils 1      % Immature Granulocytes 1      NRBCs per 100 WBC 0      Absolute Neutrophils 3.9      Absolute Lymphocytes 1.6      Absolute Monocytes 0.6      Absolute Eosinophils 0.3      Absolute Basophils 0.1      Absolute Immature Granulocytes 0.0      Absolute NRBCs 0.0         Imaging   No orders to display       EKG   ECG taken at 0733, ECG read at 0740  Normal sinus rhythm  Normal ECG   No significant changes as compared to prior, dated 8/21/24.  Rate 85 bpm. NJ interval 134 ms. QRS duration 78 ms. QT/QTc 362/430 ms. P-R-T axes 76 61 71.    Independent Interpretation   None    ED Course      Medications Administered   Medications - No data to display    Procedures   Procedures     Discussion of Management   None    ED Course   ED Course as of 05/31/25 1507   Sat May 31, 2025   0723 I obtained history and examined the patient as noted above.    0932 I rechecked the patient and explained findings. We discussed plans for discharge and the patient is agreeable with this plan.        Additional Documentation  None    Medical Decision Making / Diagnosis     CMS Diagnoses: None    MIPS   None               [facility]   Marce Lin is an 89 year old female who came in due to elevated blood pressure  today and yesterday.  She has no worrisome symptoms though to suggest anything worrisome such as cardiac ischemia, hypertensive urgency or crisis, or any other issues.  I felt it was reasonable to proceed with the above workup to rule out any end organ damage.  Her blood pressure actually came down quite a bit on its own as well, and her workup was unremarkable other than showing some mild hyponatremia.  I reassured her that this is not worrisome even though she does not want her blood pressure to be elevated for an extended period of time.  I recommended that she keep a log of her blood pressure and follow-up with her physician as soon as possible.  She knows to return with any concerns or worsening symptoms as well.    Disposition   The patient was discharged.     Diagnosis     ICD-10-CM    1. Hypertension, unspecified type  I10       2. Mild Hyponatremia  E87.1            Discharge Medications   Discharge Medication List as of 5/31/2025  9:40 AM            Scribe Disclosure:  I, Lea Wise, am serving as a scribe at 8:37 AM on 5/31/2025 to document services personally performed by Ramin Cesar MD based on my observations and the provider's statements to me.        Ramin Cesar MD  05/31/25 0357

## 2025-05-31 NOTE — ED TRIAGE NOTES
Pt states she has had a high BP that she first noticed yesterday.  Pt states she had a SBP of 180 at home.  Pt reports taking 50 mg Losartan this am at 0530, pt normally takes 25 mg.  Pt also reports feeling anxious.     Triage Assessment (Adult)       Row Name 05/31/25 0714          Triage Assessment    Airway WDL WDL        Respiratory WDL    Respiratory WDL WDL        Skin Circulation/Temperature WDL    Skin Circulation/Temperature WDL WDL        Cardiac WDL    Cardiac WDL X  See note        Peripheral/Neurovascular WDL    Peripheral Neurovascular WDL WDL        Cognitive/Neuro/Behavioral WDL    Cognitive/Neuro/Behavioral WDL WDL

## 2025-06-01 LAB
ATRIAL RATE - MUSE: 85 BPM
DIASTOLIC BLOOD PRESSURE - MUSE: NORMAL MMHG
INTERPRETATION ECG - MUSE: NORMAL
P AXIS - MUSE: 76 DEGREES
PR INTERVAL - MUSE: 134 MS
QRS DURATION - MUSE: 78 MS
QT - MUSE: 362 MS
QTC - MUSE: 430 MS
R AXIS - MUSE: 61 DEGREES
SYSTOLIC BLOOD PRESSURE - MUSE: NORMAL MMHG
T AXIS - MUSE: 71 DEGREES
VENTRICULAR RATE- MUSE: 85 BPM